# Patient Record
(demographics unavailable — no encounter records)

---

## 2025-06-18 NOTE — REASON FOR VISIT
[Initial Visit] : an initial visit for [Ingrown Nail] : ingrown nail [FreeTextEntry2] : left hallux medial

## 2025-06-18 NOTE — ASSESSMENT
[FreeTextEntry1] : Discussed diagnosis and treatment with patient  Discussed etiology of symptoms patient is experiencing  Discussed all risks, complications and benefits for procedure  Consent signed, in chart: Left hallux medial partial nail avulsion with phenol matrixectomy - 6/18/25 Skin and nail fold prepped with alcohol and the Left 1st digit local anesthesia administered with 1% plain lidocaine 4 cc  Partial nail avulsion performed with sterile English Anvil and hemostat  Sterile curette used at the base of the nail matrix Nail border was then flushed with normal saline  Phenol applied to the nail matrix and flushed with alcohol/wound cleanser  Dressed with SSD, DSD, and coban. Discussed daily wound care.  Keep dressing clean, dry and intact for next 48 hours, then apply antibiotic ointment for 1 week, and dry bandage daily  Discussed all signs and symptoms of local infection. Patient instructed to return to the office as soon as signs and symptoms arise.  Patient to return to the office in 2 weeks

## 2025-06-18 NOTE — HISTORY OF PRESENT ILLNESS
[FreeTextEntry1] : 19-year-old male presents to the [place] office would like to get an ingrown removed from left foot great toe. Having discharge coming from it 3 weeks ago. Rates pain 7/10   has had hx of ingrown nail with procedure to the left hallux lateral side by tertiary provider.

## 2025-06-18 NOTE — PHYSICAL EXAM
[General Appearance - Alert] : alert [General Appearance - In No Acute Distress] : in no acute distress [Delayed in the Left Toes] : capillary refills normal in the left toes [] : normal strength/tone [FreeTextEntry1] : Left hallux medial nail border incurvation noted. Edema and local erythema noted Left.   Focal drainage noted.  [Sensation] : the sensory exam was normal to light touch and pinprick [No Focal Deficits] : no focal deficits [Deep Tendon Reflexes (DTR)] : deep tendon reflexes were 2+ and symmetric [Motor Exam] : the motor exam was normal [Oriented To Time, Place, And Person] : oriented to person, place, and time [Impaired Insight] : insight and judgment were intact Home [Affect] : the affect was normal

## 2025-07-03 NOTE — REASON FOR VISIT
[Follow-Up Visit] : a follow-up visit for [Ingrown Nail] : ingrown nail [FreeTextEntry2] : left hallux medial ppna follow up, new-onset right hallux medial ingrown.

## 2025-07-03 NOTE — PHYSICAL EXAM
[General Appearance - Alert] : alert [General Appearance - In No Acute Distress] : in no acute distress [] : normal strength/tone [Sensation] : the sensory exam was normal to light touch and pinprick [No Focal Deficits] : no focal deficits [Deep Tendon Reflexes (DTR)] : deep tendon reflexes were 2+ and symmetric [Motor Exam] : the motor exam was normal [Oriented To Time, Place, And Person] : oriented to person, place, and time [Impaired Insight] : insight and judgment were intact [Affect] : the affect was normal [Delayed in the Left Toes] : capillary refills normal in the left toes [FreeTextEntry1] : Right hallux medial nail border incurvation noted. Edema and local erythema noted.  Left hallux medial nail border partially avulsed. Resolved edema and local erythema noted Left. No drainage noted.

## 2025-07-03 NOTE — HISTORY OF PRESENT ILLNESS
[FreeTextEntry1] : 19-year-old male presents to the [place] office for left ppna folow up. Denies pain to left great toe Now reports pain to the right hallux medial border.  has had hx of ingrown nail with procedure to the left hallux lateral side by tertiary provider.

## 2025-07-03 NOTE — ASSESSMENT
[FreeTextEntry1] : Discussed diagnosis and treatment with patient  Discussed etiology of symptoms patient is experiencing  s/p Left hallux medial partial nail avulsion with phenol matrixectomy - 6/18/25 - asymptomatic Discussed all risks, complications and benefits for procedure  Consent signed, in chart: Right hallux medial partial nail avulsion with phenol matrixectomy - 7/3/25 Skin and nail fold prepped with alcohol and the 1st digit local anesthesia administered with 1% plain lidocaine 4 cc  Partial nail avulsion performed with sterile English Anvil and hemostat  Sterile curette used at the base of the nail matrix Nail border was then flushed with normal saline  Phenol applied to the nail matrix and flushed with alcohol/wound cleanser  Dressed with SSD, DSD, and coban. Discussed daily wound care.  Keep dressing clean, dry and intact for next 48 hours, then apply antibiotic ointment for 1 week, and dry bandage daily  Discussed all signs and symptoms of local infection. Patient instructed to return to the office as soon as signs and symptoms arise.  Patient to return to the office in 2 weeks or PRN